# Patient Record
(demographics unavailable — no encounter records)

---

## 2025-01-13 NOTE — DISCUSSION/SUMMARY
[FreeTextEntry1] :  This 12 year-year-old boy presented with concerns regarding short stature A review of his referral growth chart shows that he is growing at the 25th %ile for height toward a projected height of 68 inches His assessment today showed that he is growing at the 19th%ile for height, toward a projected height of 67-68 inches He is prepubertal give his testicular volume of 3.5 cc He has constitutional delay of growth and puberty PLAN: 1.  Short stature screening lab tests, including a bone age 2.  Monitor auxology We ordered the labs shown in the section on Plan He is scheduled for a follow-up visit in 6 months His parent was satisfied with the explanation and the conduct of the visit.

## 2025-01-13 NOTE — HISTORY OF PRESENT ILLNESS
[FreeTextEntry2] : Dear Dr. JELLY LUKE  I saw your patient in the Pediatric Endocrine clinic at the [facility] This 12 year-old boy was referred for evaluation for short stature A review of his referral growth chart shows that he is growing consistently along the 25th percentile towards a projected height of 68 inches His height measurement today is at the 19th percentile, towards a projected height of 67 to 68 inches His mother thinks that he has not entered puberty and is not growing well in height

## 2025-01-13 NOTE — PAST MEDICAL HISTORY
[At Term] : at term [ Section] : by  section [None] : there were no delivery complications [Age Appropriate] : age appropriate developmental milestones met [de-identified] : Twin A [FreeTextEntry1] : 6 lb; 19.5 in

## 2025-01-13 NOTE — CONSULT LETTER
[Dear  ___] : Dear  [unfilled], [Consult Letter:] : I had the pleasure of evaluating your patient, [unfilled]. [Please see my note below.] : Please see my note below. [Consult Closing:] : Thank you very much for allowing me to participate in the care of this patient.  If you have any questions, please do not hesitate to contact me. [Sincerely,] : Sincerely, [FreeTextEntry3] : Javan Taveras M.D., FAAP. Professor of Pediatrics, [employer] at [facility] Chief of Endocrinology, [facility] Director, [facility] [place] [address] Tel: [phone]; Fax: [phone]; Email: [email or web address]

## 2025-01-13 NOTE — HISTORY OF PRESENT ILLNESS
[FreeTextEntry2] : Dear Dr. JELLY LUKE  I saw your patient in the Pediatric Endocrine clinic at the [facility] This 12 year-old boy was referred for evaluation for short stature A review of his referral growth chart shows that he is growing consistently along the 25th percentile towards a projected height of 68 inches His height measurement today is at the 19th percentile, towards a projected height of 67 to 68 inches His mother thinks that he has not entered puberty and is not growing well in height

## 2025-01-13 NOTE — PAST MEDICAL HISTORY
[At Term] : at term [ Section] : by  section [None] : there were no delivery complications [Age Appropriate] : age appropriate developmental milestones met [de-identified] : Twin A [FreeTextEntry1] : 6 lb; 19.5 in

## 2025-01-13 NOTE — PHYSICAL EXAM
You were seen in the emergency department today for migraine headache  Please follow-up with your primary care physician regarding your symptoms  Return to the Emergency Department sooner if increased pain, fever, vomiting, lethargy, blurry vision, dizziness, weakness, difficulty walking or breathing, rash  Avoid her triggers, Tylenol/Motrin at home for pain relief  Breakthrough medication as needed  Follow-up with your PCP for management  Return with any worsening symptoms  [Healthy Appearing] : healthy appearing [Well Nourished] : well nourished [Interactive] : interactive [Normal Appearance] : normal appearance [Well formed] : well formed [Normally Set] : normally set [Normal S1 and S2] : normal S1 and S2 [Clear to Ausculation Bilaterally] : clear to auscultation bilaterally [Abdomen Soft] : soft [Abdomen Tenderness] : non-tender [] : no hepatosplenomegaly [Normal] : normal  [2] : was Jarett stage 2 [___] : [unfilled] [Murmur] : no murmurs [FreeTextEntry2] : Penile length of 5.5 cm

## 2025-01-13 NOTE — PHYSICAL EXAM
[Healthy Appearing] : healthy appearing [Well Nourished] : well nourished [Interactive] : interactive [Normal Appearance] : normal appearance [Well formed] : well formed [Normally Set] : normally set [Normal S1 and S2] : normal S1 and S2 [Clear to Ausculation Bilaterally] : clear to auscultation bilaterally [Abdomen Soft] : soft [Abdomen Tenderness] : non-tender [] : no hepatosplenomegaly [Normal] : normal  [2] : was Jarett stage 2 [___] : [unfilled] [Murmur] : no murmurs [FreeTextEntry2] : Penile length of 5.5 cm

## 2025-01-13 NOTE — CONSULT LETTER
[Dear  ___] : Dear  [unfilled], [Consult Letter:] : I had the pleasure of evaluating your patient, [unfilled]. [Please see my note below.] : Please see my note below. [Consult Closing:] : Thank you very much for allowing me to participate in the care of this patient.  If you have any questions, please do not hesitate to contact me. [Sincerely,] : Sincerely, [FreeTextEntry3] : Javan Taveras M.D., FAAP. Professor of Pediatrics, [employer] at [facility] Chief of Endocrinology, [facility] Director, [facility] [place] [address] Tel: [phone]; Fax: [phone]; Email: [email or web address] <[email or web address]